# Patient Record
(demographics unavailable — no encounter records)

---

## 2017-10-25 NOTE — CT
CTA CHEST

10/25/17

 

HISTORY: 

18-year-old with history of tingling, pain and chest pressure.

 

Contrast enhanced CTA of the chest is performed. 2D and 3D reconstructed images are performed on an 
independent 3D workstation.

 

CTA chest demonstrates the lung parenchyma to be unremarkable. No evidence of pleural or pericardial
 effusion seen. 

 

No evidence of axillary, hilar or significant mediastinal lymphadenopathy seen. 

 

No evidence of filling defects seen in the pulmonary arteries to suggest pulmonary emboli. 

 

IMPRESSION:  

Normal CTA chest. 

 

POS: [facility]

## 2017-10-25 NOTE — RAD
PORTABLE AP CHEST X-RAY

10/25/2017

 

HISTORY:

Tingling left anterior forearm which started yesterday.  The patient denies tingling sensation in ch
est and abdomen.  The patient reports being 15 weeks pregnant.

 

COMPARISON: 

None available.

 

FINDINGS:

Cardiac silhouette and pulmonary vasculature are within normal limits.  Lungs are clear.  Osseous st
ructures are intact.

 

IMPRESSION:   

No acute cardiopulmonary process.

 

 

POS: [facility]

## 2017-12-03 NOTE — ULT
RIGHT UPPER QUADRANT ULTRASOUND: 

 

Indication: Right upper quadrant pain with a 21-week history of pregnancy. 

 

FINDINGS: 

The liver, visualized pancreas, gallbladder, and common bile duct are within normal limits. There is 
mild right hydronephrosis present which is likely physiologic in nature. There is flow voids seen at 
the level of the bladder. No sonographic york's sign is reported. Common bile duct measures 2.6 mm.
 The right kidney measures 11.8 cm in length. 

 

IMPRESSION: 

Physiologic mild right sided hydronephrosis.  Clinical and ultrasound follow up recommended.

 

POS: [facility]

## 2017-12-03 NOTE — PDOC.LDHP
Labor and Delivery H&P


HPI: 





17 yo  @ 21.2wks by 1TUS with EDC 2017 presents with acute onset 

abdominal pain, nausea, and vomiting after eating shrimp cocktail earlier this 

afternoon. Pt reports that she vomited white yellow color, not bloody, or 

black. Denies contractions, fluid loss, new vaginal discharge. 





GYN hx:


LMP: 2017


Pap Smear: 2016


+chlamydia at that time, treated. 





OBHx:


Pt states that she is seeing a specialist because she had a varicella titer 

come back positive.


Otherwise, no complications with ultrasounds.





Pregnancy Hx:


as stated above. Otherwise unremarkable.





PMHX:denies


PSHX: , EGD with biopsy that was negative; Pt stated this was because she 

used to have severe acid reflux. Denies having symptoms now.





Social Hx:


denies smoking, drinking, drug use





Allergies:


KNDA








Current gestational age (weeks): 21 (21.2)


Due date: 18


Dating criteria: last menstrual period, first trimester ultrasound


Grav: 1


Para: 0


OB History Details: 





see above


Current pregnancy complications: none


Abnormal US findings: No


Past Medical History: 





see above


Current medications: pre-kathryn vitamins


Previous surgical history: other (see above)


Social history: none





- Physical Exam


Vital signs reviewed and normal: yes


General: NAD, resting


Heart: RRR


Lungs: CTAB


Abdomen: gravid


Extremeties: no edema


FHT: category 1


[place] contractions every: none





- Assessment





Viral Gastroenteritis vs Biliary colic


Plan:


Pt is tolerating clear liquids at this time and has not vomited since earlier 

this afternoon ~1600.


Kvng continue to monitor pt on clear liquids and monitor the baby. Currently the 

strip is category 1. There were some initial variable decels however, these 

have resolved with oral fluids.


Pt may have gallstones in pregnancy, and at this point would recommend an 

outpatient work up.


Will plan to discharge patient this evening with close follow-up for workup of 

potential biliary colic.





<Emilie Green - Last Filed: 17 20:03>





<Jhony Malave - Last Filed: 17 01:16>


Allergies/Adverse Reactions: 


 Allergies











Allergy/AdvReac Type Severity Reaction Status Date / Time


 


No Known Allergies Allergy   Verified 17 17:44














Attending Addendum





- Attending Addendum


I personally evaluated the patient and discussed the management with Dr. Peter Lozano.  


I agree with the History, Examination, Assessment and Plan documented above 

with any addition or exceptions noted below.


She has been having acute epigastric abdominal discomfort and in the past day 

or two epigastric pain during or after eating. On my exam she is tender in the 

RUQ and has a positive Alas's sign. Clinically she appears to be having 

gallbladder colic but her GB US is negative for stones or biliary ductal 

abnormality.  Clinical follow up will be needed. In the mean time I did 

recommend her to follow a fat free/low fat diet. [facility]





<Jhony Malave - Last Filed: 17 01:16>

## 2018-01-23 NOTE — PRG
DATE OF SERVICE:  2018.

 

PRIMARY OB:  Prenatal Clinic.

 

CHIEF COMPLAINT:  Abdominal pains.

 

HISTORY OF PRESENT ILLNESS:  The patient is an 18-year-old G1, P0 female with an intrauterine pregnan
cy at 28 weeks and 4 days, who is presenting to Labor and Delivery with a 1-week history of abdominal
 pains which she reports that she has been having some worsening of the pain in the last couple of da
ys and so came in for evaluation.  Patient denies severe pain at the contractions and reports them 4/
10.  She denies any vaginal bleeding, leakage of fluid or discharge.  She denies any urinary urgency 
or frequency.  She denies any recent illness, fever, fall, headache, chest pain, shortness of breath,
 nausea or vomiting.  She denies any new rash.

 

PAST MEDICAL HISTORY:  Negative.

 

PAST SURGICAL HISTORY:  Noncontributory.

 

ALLERGIES:  No known drug allergies.

 

MEDICATIONS:  Prenatal vitamins.

 

OB LABS:  Blood type is A positive.  She is rubella immune, RPR is nonreactive, hepatitis B surface a
ntigen is negative, antibody screen is negative.  She is HIV nonreactive.

 

SOCIAL HISTORY:  Denies drug, alcohol or tobacco use.

 

REVIEW OF SYSTEMS:  Per HPI.

 

PHYSICAL EXAMINATION:

VITAL SIGNS:  Blood pressure 106/59, heart rate of 74, respiratory rate of 18, satting 100% on room a
ir, temperature 98.0.

GENERAL:  She appears to be in no acute distress.  She is alert and oriented, and cooperative and ple
asant to interact with.

HEENT:  Normocephalic, atraumatic.

LUNGS:  Clear to auscultation bilaterally.

HEART:  Regular rate and rhythm.

ABDOMEN:  Soft.  She has a little bit tenderness or deviation of the uterus in the lower pelvis.  She
 has no CVA tenderness.  No SI joint tenderness.

EXTREMITIES:  Nontender, nonedematous.

PELVIC:  Cervix per nursing staff is closed, thick and high.

 

Fetal heart tracing performed for abdominal pain over the course of 2 hours.  Baseline was noted to b
e in the 130s with moderate long-term variability, positive accelerations, no decelerations.  Contrac
tions, she has some irritability with contractions about every 7-10 minutes which she feels as occasi
onal times of tightening.

 

Fetal fibronectin is negative.

 

UA is negative for any signs of infection, negative nitrites, negative leukocyte esterase, negative k
etones, negative bacteria.  Her VP3 is negative for Trichomonas, yeast or Candida and Gardnerella.

 

ASSESSMENT AND PLAN:  The patient is an 18-year-old G1, P0 female with an intrauterine pregnancy at 2
8 weeks' having  contractions, no evidence of labor, has a negative fetal fibronectin suggeste
d that she has a very small chance of going into labor in the next couple of weeks, contractions at p
resent are not causing her discomfort.  Patient was given the option to be discharged home or she can
 rest at home and conceive, rest and eating well help her feel better and return if things get worse 
or to remain here for longer observation.  Patient is comfortable going home and with they understand
, she can return if things worsen.  She has an appointment this Friday to follow up her Prenatal Clin
ic as a routine visit which she has been encouraged to keep this.  Fetus is reassuring by fetal heart
 tracing.

## 2018-02-19 NOTE — PDOC.LDHP
Labor and Delivery H&P


Chief complaint: other (2 days of brownish discharge)


HPI: 


17 yo G1 at 32+3 by 1 T us who presents for vaginal discharge.  Patient has had 

normal fetal movement.  no evangelina BRB. no dysuria.  no gush of fluid.  





Dating criteria: first trimester ultrasound


Current pregnancy complications: other (low lying placenta that resolved with 

MFM ultrasound.  Also they were concerned for size greater than dates and 

scheduled a follow up ultrasound.)





- Physical Exam


Vital signs reviewed and normal: yes


General: NAD, resting


Heart: RRR


Lungs: CTAB


Abdomen: NTTP


Extremeties: no edema


FHT: category 1 (Reactive with accels)





- OB Labs


Blood type: A


RH: positive


Antibody Screen: negative


HIV: negative


RPR: negative


HEPSAg: negative


1 hour GCT: negative


GBS: unknown


Urine drug screen: not done





- Assessment


1. Vaginal discharge-  Will do exam to rule out bleeding and collect VP3.  Will 

continue fetal monitoring to ensure fetal well being.  No dysuria so will not 

do a UA.  So far the NST is reactive and reassuring.  








<Dalton Hinson - Last Filed: 18 10:35>





<Silvana Cat - Last Filed: 18 14:56>


Allergies/Adverse Reactions: 


 Allergies











Allergy/AdvReac Type Severity Reaction Status Date / Time


 


No Known Allergies Allergy   Verified 18 18:42














Attending Addendum





- Attending Addendum


I personally evaluated the patient and discussed the management with Dr. Hinson


I agree with the History, Examination, Assessment and Plan documented above 

with any addition or exceptions noted below- 17 yo  @32.3 weeks presented 

with vaginal discharge- pink then dark brown on Saturday. Had intercourse on 

Friday. No itching. (+) FM. VP3 negative. Category 1 FHTs. D/c home and keep 

follow-up at [facility] as scheduled. 








<Silvana Cat - Last Filed: 18 14:56>

## 2018-02-19 NOTE — PDOC.FPRHP
- Allergies/Adverse Reactions


 Allergies











Allergy/AdvReac Type Severity Reaction Status Date / Time


 


No Known Allergies Allergy   Verified 01/23/18 18:42














- Home Medications


 











 Medication  Instructions  Recorded  Confirmed  Type


 


Prenatal Vit No.130/Iron/Folic 1 capsule PO DAILY 12/03/17 01/23/18 History





[Prenatal Tablet]    














- History


PMHx:


 


PSHx: 





FHx:


 


Social:


 








- Vital signs


BP: []  HR: [] RR: [] Tmax: [] Pox: []% on []  Wt: []   








FMR H&P: Upper Level





- Plan











I, [], have evaluated this patient and agree with findings/plan as outlined by 

intern resident. Pertinent changes/additions are listed here.

## 2018-02-19 NOTE — PDOC.EVN
Event Note





- Event Note


Event Note: 


Sterile speculum examL external anatomy normal, but with thick whitish 

discharge.  cervix is closed and thick visually but again with thick white 

discharge.  no bleeding at all.

## 2018-03-24 NOTE — ULT
OB ULTRASOUND:

3/24/18

 

HISTORY: 

Fever, uterine contractions.

 

FINDINGS:  

There is evidence of a single intrauterine gestation. Cardiac doppler demonstrates fetal heart tones 
with a fetal heart rate of 153 beats per minute. Imaging of the lower uterine segment was not perform
ed to evaluate for presentation of the fetus on this exam. The placenta is located at the fundus. The
re is a normal amount of amniotic fluid with an amniotic fluid index of 12 cm. 

 

FETAL MEASUREMENTS:

Biparietal diameter      9.31 cm      37 weeks, 6 days

Head circumference      33.52 cm      38 weeks, 3 days

Abdominal circumference      32.09 cm      36 weeks

Femur length            7.31 cm      37 weeks, 3 days

 

Estimated gestational age by ultrasound is 37 weeks and 3 days with an JEANE on 4/11/18. Gestational ag
e by last menstrual period is 36 weeks and 3 days.

 

The estimated fetal weight by ultrasound is 3044 grams (6 lb, 11 oz). 

 

This examination was not performed for evaluation of the fetal anatomical structures, but there is ev
idence of a normal appearing four chamber heart, stomach, and bilateral kidneys. Remainder of the fet
al anatomical structures were not imaged on this exam. 

 

Umbilical artery doppler demonstrates a peak systolic velocity and umbilical artery of 36.3 cm/s, end
 diastolic velocity of 17.5 cm/s and systolic to diastolic ratio of 2.07. Fetal score of 2 was obtain
ed for fetal tone, fetal movements, and amniotic fluid volume with a score of 0 obtained for fetal br
eathing. 

 

IMPRESSION:  

1.      Single intrauterine gestation with fetal heart tones documented. Gestational age by ultrasoun
d is 37 weeks and 3 days with an JEANE on 4/11/18.

2.      Estimated fetal weight by ultrasound is 3044 grams (6 lb, 11 oz). This represents 65th percen
tile for fetal weight. 

3.      Imaging of the lower uterine segment was not performed to evaluate for fetal presentation. 

4.      Amniotic fluid index is 12 cm. 

5.      A total fetal biophysical profile score of 6 out of 8 is obtained. 

 

POS: [facility]

## 2018-03-24 NOTE — ULT
FETAL BIOPHYSICAL PROFILE SCORE

3/24/18

 

HISTORY: 

Maternal fever, fetal tachycardia.

 

FINDINGS/IMPRESSION:  

The fetal biophysical profile score was provided on the OB ultrasound also obtained on this date. A t
otal score of 6 out of 8 was obtained with 0 obtained for fetal breathing and a score of 2 obtained e
ach for fetal tone, fetal movements, and amniotic fluid volume. 

 

POS: [facility]

## 2018-03-24 NOTE — PDOC.LDHP
Labor and Delivery H&P


Chief complaint: contractions, other (Low back pain)


HPI: 





18 year old  at 37.1 wks by LMP/10.6 wk U/S. JEANE of 2018. Presents 

with contractions since Thursday, as well as stabbing lower back and right 

flank pain which worsened today. Associated symptoms include N/V. She has been 

unable to keep anything down, including liquids since 15:00 today. Patient's 

current pregnancy has been uncomplicated. PMH is significant only for GERD, not 

treated with any medications. She did have asymptomatic bacteruria with 

negative CARMELINA during current pregnancy. History of candidal vaginitis and BV 

which were treated. 


Due date: 18


Dating criteria: last menstrual period, first trimester ultrasound


Grav: 1


Para: 0


OB History Details: 





1. (+) varicella titers


2. Headaches in pregnancy


3.  contractions


4. Low lying placenta, resolved by MFM sono


5.  contractions


Current pregnancy complications: none


Abnormal US findings: No


Past Medical History: 





1. GERD, not currently being treated


Current medications: pre- vitamins


Previous surgical history: other (Biopsy of stomach in , Cyst removal on 

face)





- Physical Exam


Vital signs reviewed and normal: yes


Abnormal vital signs: , Temp 101.0 F


General: NAD, resting


Heart: other (Tachycardic, no murmur, rubs or gallops)


Lungs: nonlabored breathing


Abdomen: gravid


Extremeties: no edema


FHT: category 2, variability present


[place] contractions every: q3-4 min





- Vaginal Exam


cm dilated: 0


Effacement: 0%


Station: -3





- OB Labs


Blood type: A


RH: positive


Antibody Screen: negative


HIV: negative


RPR: negative


HEPSAg: negative


GBS: negative


Rubella: immune





- Assessment





1. Sepsis, source unknown


2. Maternal tachycardia 2/2 to #1


3. Lactic acidosis


4. Leukocytosis


5. TIUP


6. GERD





- Plan


Plan: admit to L&D


-: 





1. Labs: Blood and urine culture, LA, CBC, CMP, lactic acid


2. BPP


3. U/S for growth


4. Start abx: rocephin


5. LR boluses x3


6. NS IVF at 150 ml/hr


7. Speculum exam to evaluate for discharge


8. VP3 and GC/C swabs


7. Tylenol prn for fever





<Allison Chi - Last Filed: 18 23:36>





<Eloisa Dunn - Last Filed: 18 12:22>


Allergies/Adverse Reactions: 


 Allergies











Allergy/AdvReac Type Severity Reaction Status Date / Time


 


No Known Allergies Allergy   Verified 18 20:05














Attending Addendum





- Attending Addendum


Date/Time: 18 1155





I personally evaluated the patient and discussed the management with Dr. Chi


I agree with the History, Examination, Assessment and Plan documented above 

with any addition or exceptions noted below.








17 yo  female at 37.1 wks by LMP/10.6 wk [place] presents for evaluation of 

contractions, N/V, back pain, and chills. 


Patient reports ctx started on Thursday and have slowly progressed throughout 

the week. Rated 7/10. Occurring every 2 to 3 minutes. +FM. Denies LOF or VB. 

Reports increase white thick discharge per vagina. States back pain started 

Wednesday of this week. Reports N/V and chills today starting at 1500. No sick 

contacts. Denies any other  or GI symptoms. Denies food poisoning. 





Pregnancy history reviewed. Past history of asymptomatic bacteruria with pan 

sensitive E. coli treated with CARMELINA negative. Ct/GC negative. GBS negative. Did 

have history of low lying posterior placenta that resolved in late 2T. Noted to 

have Varicella IgM titer during pregnancy but asymptomatic. [facility] [place] WNL. Has 

followed up routinely with [facility]. Received the Flu vaccine this year. 





Temp 101 mHR = 120.  with minimal varibility, acels present no decels


Appears ill but NAD. Regular but tachycardic rate. Lungs clear. No W/C/R. 

Fundus mildly tender to palpation but inconsistent with examiners. Soft 

abdomen. CVA tenderness noted. 





Blood cultures quickly obtained. IVF boluses started. Labs ordered. Cath urine 

sent. Due to exam and HPI Rocephin given due to suspicion of pyelo. 





On vaginal exam. Mild cervicitis noted. Gc/CT swabs taken. VP3 taken. No pain 

with cervical manipulation. Os closed. No purulent discharge from os. Thick 

white discharge consistent with yeast noted on vaginal sidewalls. 





BPP 6/8 (off for breathing but good fetal movement throughout exam). EFW = 

3044g (65% per Hadlock) BLAIR = 12 cm. 





1. sIUP: IOB labs reviewed. Quad negative. Anatomy WNL. Posterior placenta. 

Cephalic. 3T negative. GBS negative. 


2. Maternal sepsis with unknown source: Continue IVF bolus until maternal and 

fetal vital signs improved. Trend lactic acid. Repeat CBC in AM. Cath UA 

negative. Will start Amp and Gent. Inconsistent fundal tenderness on serial 

exam but unable to perform amniocentesis for cultures. Blood and urine pending. 

Flu swab sent. Abdominal sono ordered to rule out GI pathology. GcCt pending. 

If unable to improve fetal status will discuss need for delivery. However BPP 

still reassuring. 


3. hx of aymptomatic bacteruria


4. Varicella exposure? IgM pos. Asymptomatic. No none exposure. MFM sono WNL. 


5. Posterior low lying placenta: Resolved. 


6. hx of Ct infection 2016: Negative GC/CT this pregnancy. Denies risk. 


7. hx of BV and candida infection: Treated. 





Continue continuous monitoring. Continue IVF bolus. Continue empiric 

antibiotics. Trend labs. Imaging results pending. Continue serial fundal exams. 


Melisa





<Eloisa Dunn - Last Filed: 18 12:22>

## 2018-03-25 NOTE — PDOC.EVN
Event Note





- Event Note


Event Note: 





19 yo  at 37.2 wks by LMP/10.6 wk sono admitted for maternal sepsis (now 

resolved) with unknown source. 


HD#1





Patient placed on intermittent fetal monitoring. 


/mod/pos acels/no decels. 


Ctx: 1 over 1.5 hours








Patient sleeping comfortably. VSS. Afebrile (t max 98.8). No fundal tenderness. 


Blood cx negative to date


Urine cx negative to date.


Latic acidosis - resolved.


WBC 20 --> 13.





Abd sono negative except for mod to severe hydronephrosis. -- Have spoke with 

urology due to severe hydronephrosis and septic picture. Will evaluate patient. 

Believed source could possibly be  related. Suggested if gets worse obtain XR 

of abdomen to see if stone noted. 


UA negative except for ketones. 





Will continue expectant management at this time. 


Have discussed case with laboristKarlos, urology. 


No indication for need for delivery at this time. Continue antibx until 

cultures result.  





Melisa

## 2018-03-25 NOTE — PDOC.LDPN
Labor & Delivery Progress Note





- Subjective


Subjective: comfortable





- Objective


Vital signs reviewed and normal: yes


General: NAD, resting


Uterine fundus: non tender


SVE: 01:30 by Alon


Dilation: 0


Effacement: 0%


Station: -3


FHT: category 1, variability present


[place] contractions every: Irregular





- Assessment


(1) Sepsis


Code(s): A41.9 - SEPSIS, UNSPECIFIED ORGANISM   Current Visit: Yes   Status: 

Acute   


  QualifierTitle:    Sepsis type: sepsis due to unspecified organism   

Qualified Code(s): A41.9 - Sepsis, unspecified organism   


Comment: 18 year old presents with tachycardia, fever to 101.0 F


- Leukocytosis with 91% neutrophils


- Lactic acidosis s/p 3L fluids


- Fever s/p tylenol 1000 mg


- VP3 negative x3, however, speculum exam showed vaginal discharge consistent 

with candidal vaginitis; will treat since sensitivity and specificty of VP3 70-

80%


- Flu swab negative


- Blood and urine cultures pending


- UA negative   





(2) Leukocytosis


Code(s): D72.829 - ELEVATED WHITE BLOOD CELL COUNT, UNSPECIFIED   Current Visit

: Yes   Status: Acute   Comment: - WBC 19 with 90% neutrophils


- Fluid resucitated with 3L fluid and on maintenance IVF at 150 mL/hr


- AM CBC pending   





(3) Lactic acidosis


Code(s): E87.2 - ACIDOSIS   Current Visit: Yes   Status: Acute   Comment: - LA 

3.5


- Repeat LA pending


- s/p 3L fluid and maintenance IVF   





(4) Intrauterine pregnancy in teenager


Code(s): Z34.80 - ENCOUNTER FOR SUPRVSN OF NORMAL PREGNANCY, UNSP TRIMESTER   

Current Visit: Yes   Status: Acute   Comment: - At 37.2 wks with JEANE of 2018


- Closed, thick and high at 01:30   





<Allison Chi - Last Filed: 18 01:51>





Attending Addendum





- Attending Addendum


Date/Time: 18 1223





I personally evaluated the patient and discussed the management with Dr. Chi


I agree with the History, Examination, Assessment and Plan documented above 

with any addition or exceptions noted below.





17 yo  female at 37.1 wks by LMP/10.6 wk sono admitted for maternal sepsis 

with unknown source. 


Patient now with improved HR to 90s. FHT now 160s with mod variability and 

accels. Still with frequent contractions but cervical exam unchanged. No longer 

having fundal tenderness. Will continue fluid bolus x1 then switch to 150 mL/

hr. 





BPP 6/8 (off for breathing but good fetal movement throughout exam). EFW = 

3044g (65% per Hadlock) BLAIR = 12 cm. 





1. sIUP: IOB labs reviewed. Quad negative. Anatomy WNL. Posterior placenta. 

Cephalic. 3T negative. GBS negative. 


2. Maternal sepsis with unknown source: Continue IVF bolus until maternal and 

fetal vital signs improved. Trend lactic acid. Repeat CBC in AM. Cath UA 

negative. Will start Amp and Gent. Inconsistent fundal tenderness on serial 

exam. Blood and urine pending. Flu swab negative. Abdominal sono ordered to 

rule out GI and other  pathology. GcCt pending. If unable to improve fetal 

status will discuss need for delivery. However BPP still reassuring. FHT 

improving with IVF and antibx. Contractions now spaced. Patient no longer 

experiencing much pain with contractions. 


3. hx of aymptomatic bacteruria


4. Varicella exposure? IgM pos. Asymptomatic. No none exposure. MFM sono WNL. 


5. Posterior low lying placenta: Resolved. 


6. hx of Ct infection 2016: Negative GC/CT this pregnancy. Denies risk. 


7. hx of BV and candida infection: Treated. 





Continue continuous monitoring. Continue IVF bolus. Continue empiric 

antibiotics. Trend labs. Imaging results pending. Continue serial fundal exams. 

Laborist on-call present for initial workup and evaluation. No added 

recommendations. 





Melias





<Eloisa Dunn - Last Filed: 18 12:30>

## 2018-03-25 NOTE — PDOC.LDPN
Labor & Delivery Progress Note





- Subjective


Subjective: comfortable





- Objective


Vital signs reviewed and normal: yes


General: NAD


Uterine fundus: non tender


Dilation: 0


Effacement: 0%


Station: -3


FHT: category 1, variability present, absent or minimal variables


[place] contractions every: 8-10





- Assessment


(1) Sepsis


Code(s): A41.9 - SEPSIS, UNSPECIFIED ORGANISM   Current Visit: Yes   Status: 

Acute   


  QualifierTitle:    Sepsis type: sepsis due to unspecified organism   

Qualified Code(s): A41.9 - Sepsis, unspecified organism   


Comment: 18 year old presents with tachycardia, fever to 101.0 F


- Leukocytosis 


- presented with lactic acidosis. No resolved after 3L fluids 


- Fever s/p tylenol 1000 mg


- Flu swab negative


- Blood and urine cultures pending


- UA negative


- Pt is not sick appearing and is stable. Continue to search for source. 

Consider GB and appy imaging today.    





(2) Lactic acidosis


Code(s): E87.2 - ACIDOSIS   Current Visit: Yes   Status: Resolved   Comment: -

Resolved   





(3) Leukocytosis


Code(s): D72.829 - ELEVATED WHITE BLOOD CELL COUNT, UNSPECIFIED   Current Visit

: Yes   Status: Acute   Comment: - Repeat WBC 13.0


- Continue maint IVF dt previous n/v. Consider stopping after pt is 

consistently able to tolerate PO


- Cx pending   





(4) Intrauterine pregnancy in teenager


Code(s): Z34.80 - ENCOUNTER FOR SUPRVSN OF NORMAL PREGNANCY, UNSP TRIMESTER   

Current Visit: Yes   Status: Acute   Comment: - At 37.2 wks with JEANE of 2018


- Closed, thick and high at 01:30. No change on repeat exam at 0800   





(5) Vaginal candidiasis


Code(s): B37.3 - CANDIDIASIS OF VULVA AND VAGINA   Current Visit: Yes   Status: 

Acute   Comment: - VP3 negative x3, however, speculum exam showed vaginal 

discharge consistent with candidal vaginitis; will treat since sensitivity and 

specificty of VP3 70-80%. Tx pending   





<Mathew Cruz - Last Filed: 18 08:15>





Attending Addendum





- Attending Addendum


Date/Time: 18 1231





I personally evaluated the patient and discussed the management with Dr. Cruz


I agree with the History, Examination, Assessment and Plan documented above 

with any addition or exceptions noted below.





17 yo  female at 37.2 wks by LMP/10.6 wk sono admitted for maternal sepsis 

with unknown source. 


Patient now improved. Normal maternal HR. Reports back pain and discomfort has 

improved. +FM. Intermittent contractions throughout the night. Nonpainful. 

Cervical exam this morning unchanged. FHT now 130/mod/pos acels/no decels. 

Recieved 4L of fluid last night. 





Will continue maintenance rate throughout the day. Reassuring fetal heart 

tracing since 0100. Will obtain BPP today. If 8/8 will hold continuous 

monitoring and switch to q shift and prn. Continue antibx. Fundus completely 

nontender on exam this AM. All labs improved. Afebrile overnight. 





BPP 6/8 (off for breathing but good fetal movement throughout exam), BLAIR = 12 

cm. Repeat pending for this AM.  


EFW = 3044g (65% per [facility]) at 37.1 wks. 





1. sIUP: IOB labs reviewed. Quad negative. Anatomy WNL. Posterior placenta. 

Cephalic. 3T negative. GBS negative. 


2. Maternal sepsis with unknown source: Continue IVF at maintenance rate today. 

Lactic acidosis now resolved. WBC improved from 19.6 to 13. Cath UA negative. 

Continue Amp and Gent at least until cultures results. Inconsistent fundal 

tenderness on serial exam, now without any. Blood and urine cultures pending. 

Flu swab negative. Abdominal sono pending this AM. GcCt pending. BPP pending 

for this AM as well. Maternal VS stable. Intermittent nonpainful contractions 

with Cat 1 tracing. 





Due to etiology unknown and inability to perform amniocentesis, discussed case 

at length with Laborist and Karlos on-call in order to best decide active vs 

expectant management. R/B/A discussed. Due to vast improvement and reassuring 

fetus status will continue expectant management and workup. If patient or fetus 

demonstrate any concerns will then proceed to active management. 








3. hx of aymptomatic bacteruria


4. Varicella exposure? IgM pos. Asymptomatic. No none exposure. MFM sono WNL. 


5. Posterior low lying placenta: Resolved. 


6. hx of Ct infection 2016: Negative GC/CT this pregnancy. Denies risk. 


7. hx of BV and candida infection: Treated. 





Continue continuous monitoring. Continue IVFs. Continue empiric antibiotics at 

least until cultures result. Trend labs. Imaging results pending. Continue 

serial fundal exams. 





Melisa





<Eloisa Dunn - Last Filed: 18 13:01>

## 2018-03-25 NOTE — ULT
ABDOMINAL ULTRASOUND:

 

Date:  03/25/18 

 

PROVIDED CLINICAL HISTORY:   

Right-sided abdominal pain. 

 

FINDINGS:

Visualized abdominal aorta, IVC, and pancreas appear normal. The liver demonstrates no mass or intrah
epatic biliary ductal dilatation. The common duct is not dilated. The gallbladder demonstrates no sto
warren, wall thickening, or pericholecystic fluid. There is severe right-sided hydronephrosis. Right kid
krzysztof demonstrates no evidence for mass. The left kidney demonstrates no hydronephrosis or mass. The sp
ophelia is upper limits of normal in size without focal abnormality. Limited sonographic interrogation o
f the right lower quadrant does not demonstrate a discretely identified appendix. 

 

IMPRESSION: 

1.  Moderate-severe right hydronephrosis. 

2.  Nonvisualization of the appendix. 

 

 

POS: [facility]

## 2018-03-25 NOTE — CON
DATE OF CONSULTATION:  2018

 

PRIMARY OB:  Dr. Eloisa Dunn.

 

REFERRING PHYSICIAN:  Dr. Eloisa Dunn.

 

CHIEF COMPLAINT:  Evaluation for delivery.

 

HISTORY OF PRESENT ILLNESS:  The patient is an 18-year-old female who presented to Labor and Delivery
 last night with fever and fetal tachycardia.  A thorough workup yielded no definitive results for so
urce of infection.  With IV hydration, the fetal tachycardia resolved.  Labs were significant and an 
elevated lactate level of 3.5, which returned to 1 after hydration.  Patient has an intrauterine preg
du at 37 weeks and there was some concern a question whether the patient had indication for delive
ry at this time.  Talking with the patient, the patient reports she began feeling nauseous yesterday 
around 2:00 and subsequently had vomited a few times after 4.  She denies feeling that prior to this,
 denies any sick contacts or anything else unusual.  On arrival, the patient was started on Rocephin 
and gentamicin for presumed pyelonephritis and is currently on ampicillin.  Patient today at time of 
evaluation reports she is feeling better.  She is still having some right-sided tenderness in her larry
k and her side, but is improved.

 

PHYSICAL EXAMINATION:

VITAL SIGNS:  This morning, blood pressure 107/59, heart rate of 90, respiratory rate of 18.  Current
 temperature 98.8, T-max of 100.3.

GENERAL:  She appears to be in no acute distress.  She is alert and oriented, cooperative and pleasan
t to interact with.

HEAD:  Normocephalic, atraumatic.

LUNGS:  Clear to auscultation bilaterally.

HEART:  Regular rate and rhythm.

ABDOMEN:  Soft and gravid.  She does have some mild tenderness in her right side to upper quadrant po
sterior to the gravid uterus.  She has some flank tenderness deep to the musculoskeletal region of he
r back, all mild.

EXTREMITIES:  No extremity tenderness or edema to palpation.

 

LABORATORY DATA:  A urinalysis yesterday was completely void of any signs of infection.  White count 
is 13, down from 19.6, hemoglobin 11.8, hematocrit 35.1, neutrophil percentage 85%.  BMP is unremarka
ble except for the lactic acid which dropped from 3.5-1.3 after hydration.  Hepatitis B and syphilis 
are both negative.  Biophysical profile today is 8/8.  Abdominal ultrasound shows moderate to severe 
right hydronephrosis and nonvisualized appendix, gallbladder appeared to be normal without stones or 
wall thickening.  Again, BPP is 8/8.  Tracings continued to be reactive through the day.

 

ASSESSMENT AND PLAN:  The patient is an 18-year-old female with an intrauterine pregnancy at 37 weeks
 and 2 days who appears to have some sort of infectious process going on that as evidenced by a leuko
cytosis and an elevated temperature.  Workup is possibly supported by elevated lactic acid which has 
since resolved.  The patient has remained afebrile since the initial 100.3 and white count has droppe
d to 13.  The patient's symptoms on exam are all mild and could be supported by this hydronephrosis t
hat is seen on ultrasound that may be due to the size and position of the uterus as the patient is sm
all in stature.  There are no signs of infection to support infection source at this time.  I was giv
en the entire picture.  Recommendations would be expected management as there is no clear indication 
for delivery at this time and the risk for induction include  and complications associated w
ith  in future pregnancies.

 

Thank you for the opportunity to participate in Ms. Patterson's care should her status change with more
 clear signs of infection.  She may warrant a medically indicated induction with the understood risks
 of that direction.

## 2018-03-25 NOTE — ULT
FETAL BIOPHYSICAL PROFILE ULTRASOUND EXAMINATION:

 

Date:  03/25/18 

 

HISTORY:  

Maternal infection. 

 

FINDINGS:

There is a single intrauterine gestation in cephalic presentation. Cardiac Doppler demonstrates fetal
 heart tones, with a fetal heart rate of 143 beats/minute. The placenta is located posteriorly and fu
ndally without evidence of placenta previa. There is a normal amount of amniotic fluid with an amniot
ic fluid index of 11.4 cm. 

 

A score of 2 was obtained each for fetal tone, fetal breathing, fetal movements, and amniotic fluid v
olume. 

 

Fetal anatomical structures were not evaluated with this examination and fetal measurements were also
 noted obtained on this exam. 

 

IMPRESSION: 

1.  Single intrauterine gestation in cephalic presentation with fetal heart tones documented. 

 

2.  Amniotic fluid index is 11.4 cm. 

 

3.  A total fetal biophysical profile score of 8 out of 8 is obtained. 

 

 

POS: [facility]

## 2018-03-26 NOTE — PDOC.LDPN
Labor & Delivery Progress Note





- Subjective


Subjective: comfortable, other (19 yo  who initially presented with right 

flank pain and n/v. She endorses mild right flank pain this am. But she has 

been tolerating a normal diet.)





- Objective


Vital signs reviewed and normal: yes


General: NAD, resting


Other exam findings: Cardiac: RRR, no m/r/g, normal s1 and s2; Resp: CTAB, no w/

r/r


Procedures: Abd: gravid; mildly right CVA tenderness





- Assessment


(1) Sepsis


Code(s): A41.9 - SEPSIS, UNSPECIFIED ORGANISM   Current Visit: Yes   Status: 

Acute   


  QualifierTitle:    Sepsis type: sepsis due to unspecified organism   

Qualified Code(s): A41.9 - Sepsis, unspecified organism   


Comment: 18 year old who initially presented with tachycardia, fever to 101.0 F

, and leukocytosis


- presented with lactic acidosis. No resolved after 3L fluids 


- Flu swab negative


- Blood and urine cultures NGTD


- UA negative


- Abdominal ultrasound showed right moderate to severe hyodronephrosis


- Urology consulted, pending recommendations


- Pt is not sick appearing and is stable. She is tolerating a normal diet.   





(2) Leukocytosis


Code(s): D72.829 - ELEVATED WHITE BLOOD CELL COUNT, UNSPECIFIED   Current Visit

: Yes   Status: Acute   Comment: - Repeat WBC 7.3


- Fluids dc'd as she is tolerating a normal diet.


   





(3) Lactic acidosis


Code(s): E87.2 - ACIDOSIS   Current Visit: Yes   Status: Resolved   Comment: -

Resolved   





<Emilie Green - Last Filed: 18 10:19>





Attending Addendum





- Attending Addendum


Date/Time: 18 4366





I personally evaluated the patient and discussed the management with Dr. Lozano and team.


I agree with and repeated the History, Examination, Assessment and Plan 

documented above with any addition or exceptions noted below.





Improved from admission.  Await urologic input concerning R hydronephrosis.  

Continue antibiotics.








<Yordy Calderón - Last Filed: 18 11:37>

## 2018-03-26 NOTE — CON
DATE OF CONSULTATION:  03/26/2018 by Dr. Askew.

 

CONSULTING PHYSICIAN:  Family Medicine.

 

REASON FOR CONSULTATION:  Severe right hydronephrosis and flank pain.

 

HISTORY OF PRESENT ILLNESS:  Binta is an 18-year-old white female G1, P0, currently 37 weeks' preg
nant, who presented with a fever of 101 and a fairly severe right flank pain.  She underwent a renal 
ultrasound, which demonstrated severe right-sided hydronephrosis, which was worse than what would be 
expected during a routine pregnancy.  She was started on Rocephin originally with significant resolut
ion of her fever and white count, which was initially 19.6, which has subsequently decreased down to 
7.3.  Out of some potential concern for chorioamnionitis, she was switched to ampicillin and gentamic
in.  She has not had any further fevers or elevated white count while in the hospital, but she contin
ues to have fairly significant right flank pain, which is poorly controlled.  She is currently being 
treated with low-dose fentanyl and intermittent Tylenol with inadequate control of her pain.  On my d
iscussion with the patient, she has no prior history of hematuria, kidney stones, urinary tract infec
tions, or voiding difficulties.  Interestingly, she had a urinalysis done at the time of arrival, whi
ch did not demonstrate any blood in the urine, white cells, leukocyte esterase, or any other findings
 other than 40 ketones.  Cultures were taken, both from the urine and blood, which demonstrated no gr
owth in the urine and no growth in both blood cultures.

 

ALLERGIES:  None.

 

CURRENT MEDICATIONS:  Prenatal vitamins.

 

PAST MEDICAL HISTORY:  Gastroesophageal reflux disease.

 

PAST SURGICAL HISTORY:  None.

 

SOCIAL HISTORY:  Patient denies illicit drug, smoking, or alcohol abuse.

 

FAMILY HISTORY:  Noncontributory.

 

REVIEW OF SYSTEMS:  A 12-point review of systems was reviewed and otherwise unremarkable other than w
hat was commented on the HPI.  Specifically, she is no longer having any fevers or chills, nausea, or
 vomiting.  She still has right-sided flank pain, denies any urinary symptoms as noted above.  Remain
sheri of review of systems was reviewed and negative.

 

PHYSICAL EXAMINATION:

VITAL SIGNS:  Temperature 98.2, pulse 78, respirations 16, blood pressure 118/72, saturations 99% on 
room air.

GENERAL:  No apparent distress, communicative, alert, well-nourished, well-developed, appears stated 
age.

HEENT:  Normocephalic, atraumatic.  Sclerae nonicteric.  Pupils are symmetric and round.  Moist mucou
s membranes.  Adequate dentition.  Trachea midline.

CARDIOVASCULAR:  Regular rate and rhythm.  Normal S1 and S2.  Physiologic murmur.  Symmetric pulses.

CHEST:  Clear to auscultation bilaterally.  No increased work of breathing.  Symmetric expansion of l
ungs.

ABDOMEN:  Gravid uterus with mild tenderness to palpation in the right lower quadrant.  Mild right CV
A tenderness.  No tenderness on the left.  No significant suprapubic tenderness.

GENITOURINARY:  Deferred at this time.

EXTREMITIES:  1+ edema bilaterally.  No clubbing or cyanosis.

MUSCULOSKELETAL:  No joint deformities or joint erythema noted.  Full range of motion in all extremit
ies.

NEUROLOGIC:  Cranial nerves II-XII grossly intact.  No focal motor or sensory deficits identified.

SKIN:  Warm, dry, good turgor without lesions or rashes.

PSYCHIATRIC:  Alert and oriented x3, appropriate mood and affect for situation.

 

LABORATORY EVALUATION:  A full set of labs are in the [facility] system, which I have reviewed.  Of not
e, the patient's white count is currently 7.3, down from 19.6, creatinine is currently 0.55.  Sodium 
is slightly low at 134.  Urinalysis is unremarkable other than 40 ketones.  Cultures are negative x3.
  On imaging, abdominal ultrasound demonstrates severe right-sided hydronephrosis with no hydronephro
sis on the left.  Bladder was not evaluated.

 

ASSESSMENT AND PLAN:  An 18-year-old  female with very likely ureteral stone, although the ur
inalysis is negative.  There is approximately 3%-4% incidence of stones passing without apparent hema
turia if the stone is completely obstructive.  Given the severity of her pain and her current gestati
onal timeframe, she is at term and I would recommend induction of labor at this point, although we ca
n obtain a CT scan relatively safely at her current time and pregnancy.  I would not necessarily aly
mmend any surgical procedures unless she delivers.  She really does not wish to perform a CT scan whi
le she is pregnant despite the low risk, but again given that she is already 37 weeks, I think would 
be reasonable for her to go ahead and be induced.  Once she has delivered her baby, we can obtain a C
T scan to evaluate for stone or other potential causes of her hydronephrosis.  If the hydronephrosis 
is entirely caused by the pregnancy then her symptoms would resolve.  After she delivered, if there i
s a stone present, then we will discover on the CT scan, at which time we can treat her on this hospi
evert stay with ureteroscopy or with shockwave lithotripsy to break up the stone.  If there is another 
etiology, we can do further workup without concern for potential fetal harm given if that she is alre
kev delivered.  The patient already states that she would like to deliver to avoid having to take mor
e pain medications or delay out her treatment for her flank pain, I think this is very reasonable fro
m my standpoint.  I do think that her pain is being inadequately treated and I think that she can hayden
e higher doses of pain medication.  Given that she is already in her third trimester without any sign
ificant danger or compromise to the fetus.  I will increase her pain medication to Norco 5/325 mg 1-2
 tablets q.4 hours p.r.n. pain and a recommend induction of labor, which can be carried out by the OB
/GYN team or the Family Medicine team.  Once she has delivered her baby, then we will plan for a CT s
can, if her pain has not resolved, I will continue to follow along and make recommendations.  For now
, I will obtain a renal bladder ultrasound to look for ureteral jets on the right.  If there is an ab
sence of a ureteral jet on the right, this will make a stronger recommendation for her to go ahead an
d deliver the baby first.

## 2018-03-26 NOTE — ULT
RENAL ULTRASOUND:

 

Comparison: None. 

 

History: Pregnant female with hydronephrosis. Evaluate for ureteral jets. 

 

Technique: Multiplanar grayscale and color doppler images were obtained in a renal ultrasound. 

 

FINDINGS: 

The left kidney is normal in echogenicity without hydronephrosis or calculus and measures 10.7 cm in 
length. There is moderate right sided hydronephrosis. The right kidney measures 9.8 cm in length. 

 

The patient voided just before the exam was performed. The left ureteral jet could be visualized easi
ly. The right ureteral jet was difficult to visualize but was eventually seen. 

 

IMPRESSION: 

Moderate right hydronephrosis. 

 

POS: [facility]

## 2018-03-27 NOTE — PDOC.EVN
Event Note





- Event Note


Event Note: 


OB on Call:


I was called early this AM informally by Dr nguyen (not official consult) to 

"clear" the patient for induction of labor. EGA 37 weeks 4 days, possible 

obstructed uropathy NOS. Urology has evaluated. Also with possible Fever 

unknown origin. As we reviewed her case on the phone, and as urology has now 

requested delivery due to possible pressure on ureter from gravid uterus, I do 

beliebe trial of induction is medically necessary.

## 2018-03-27 NOTE — PDOC.LDPN
Labor & Delivery Progress Note





- Subjective


Subjective: comfortable, other (18 year old G1 who initially presented with 

tachycardia, fever to 101.0 F, and leukocytosis)





- Objective


Vital signs reviewed and normal: yes


General: NAD, resting


Dilation: closed


Effacement: 0%


Station: -3


FHT: category 1


[place] contractions every: not cesar





- Assessment


(1) Term pregnancy


Code(s): Z34.80 - ENCOUNTER FOR SUPRVSN OF NORMAL PREGNANCY, UNSP TRIMESTER   

Current Visit: Yes   Status: Acute   Comment: Early term pregnancy, medically 

induced 2/2 sepsis d/t suspected right ureteral stone.


-Vital wnl, leukocytosis resolved, pain inadequately controlled.


-Cytotec for induction; last check 0730 closed/0/high


-Labor checks q4h


-okay to eat clear liquid diet   





(2) Sepsis


Code(s): A41.9 - SEPSIS, UNSPECIFIED ORGANISM   Current Visit: Yes   Status: 

Acute   


  QualifierTitle:    Sepsis type: sepsis due to unspecified organism   

Qualified Code(s): A41.9 - Sepsis, unspecified organism   


Comment: 18 year old G1 who initially presented with tachycardia, fever to 

101.0 F, and leukocytosis


- presented with lactic acidosis. No resolved after 3L fluids 


- Flu swab negative


- Blood and urine cultures NGTD


- UA negative


- Abdominal ultrasound showed right moderate to severe hyodronephrosis


- Urology consulted, recommended IOL, medically indicated


- Dr. Fuentes consulted, recommended IOL, medically indicated


- Pt is not sick appearing and is stable.   





(3) Leukocytosis


Code(s): D72.829 - ELEVATED WHITE BLOOD CELL COUNT, UNSPECIFIED   Current Visit

: Yes   Status: Acute   Comment: - Repeat WBC 7.3


- Fluids dc'd as she is tolerating a normal diet.


-Will switch to clears for IOL.


   





(4) Lactic acidosis


Code(s): E87.2 - ACIDOSIS   Current Visit: Yes   Status: Resolved   Comment: -

Resolved   





<Emilie Geren - Last Filed: 03/27/18 08:45>





Attending Addendum





- Attending Addendum


Date/Time: 03/27/18 1158





I personally evaluated the patient and discussed the management with Dr. Lozano and team.


I agree with and repeated the History, Examination, Assessment and Plan 

documented above with any addition or exceptions noted below.





Cat 1, ctx q2-3m, continue induction as recommended by urology and ob/gyn.  

Appreciate their assistance.











<Yordy Calderón - Last Filed: 03/27/18 11:59>

## 2018-03-27 NOTE — PDOC.LDPN
Labor & Delivery Progress Note





- Subjective


Subjective: comfortable





- Objective


Vital signs reviewed and normal: yes


General: NAD, resting


Dilation: 1.5


Effacement: 50%


Station: -3


FHT: category 1


[place] contractions every: 1-2 minutes





- Assessment


(1) Term pregnancy


Code(s): Z34.80 - ENCOUNTER FOR SUPRVSN OF NORMAL PREGNANCY, UNSP TRIMESTER   

Current Visit: Yes   Status: Acute   Comment: Early term pregnancy, medically 

induced 2/2 sepsis d/t suspected obtructive uropathy.


-Cytotec for induction; last check 1630 1.5/50/high


-cytotec placed shortly after lunch; currently contractions too close together 

to place another at this time.


-Labor checks q4h


-okay to eat clear liquid diet   








(3) Sepsis


Code(s): A41.9 - SEPSIS, UNSPECIFIED ORGANISM   Current Visit: Yes   Status: 

Acute   


  QualifierTitle:    Sepsis type: sepsis due to unspecified organism   

Qualified Code(s): A41.9 - Sepsis, unspecified organism   


Comment: 18 year old G1 who initially presented with tachycardia, fever to 

101.0 F, and leukocytosis


- presented with lactic acidosis. No resolved after 3L fluids 


- Flu swab negative


- Blood and urine cultures NGTD


- UA negative


- Abdominal ultrasound showed right moderate to severe hyodronephrosis


- Urology consulted, recommended IOL, medically indicated


- Dr. Fuentes consulted, recommended IOL, medically indicated


- Pt is not sick appearing and is stable.   





(4) Leukocytosis


Code(s): D72.829 - ELEVATED WHITE BLOOD CELL COUNT, UNSPECIFIED   Current Visit

: Yes   Status: Acute   Comment: - Repeat WBC 7.3


- Fluids dc'd as she is tolerating a normal diet.


-Will switch to clears for IOL.


   





(5) Lactic acidosis


Code(s): E87.2 - ACIDOSIS   Current Visit: Yes   Status: Resolved   Comment: -

Resolved   





<Emilie Green - Last Filed: 03/27/18 20:41>





Attending Addendum





- Attending Addendum


Date/Time: 03/28/18 5668





I personally evaluated the patient and discussed the management with team


I agree with the History, Examination, Assessment and Plan documented above 

with any addition or exceptions noted below.








<Yordy Calderón - Last Filed: 03/28/18 17:39>

## 2018-03-27 NOTE — PDOC.LDPN
Labor & Delivery Progress Note





- Subjective


Subjective: comfortable





- Objective


General: NAD


Dilation: fingertip 


Effacement: 50%


Station: -3


FHT: category 1


[place] contractions every: 1-2 minutes





- Assessment


(1) Term pregnancy


Code(s): Z34.80 - ENCOUNTER FOR SUPRVSN OF NORMAL PREGNANCY, UNSP TRIMESTER   

Current Visit: Yes   Status: Acute   Comment: Early term pregnancy, medically 

induced 2/2 sepsis d/t suspected obtructive uropathy.


-Cytotec for induction; last check 1230 fingertip/50/high


-Labor checks q4h


-okay to eat clear liquid diet   








(3) Sepsis


Code(s): A41.9 - SEPSIS, UNSPECIFIED ORGANISM   Current Visit: Yes   Status: 

Acute   


  QualifierTitle:    Sepsis type: sepsis due to unspecified organism   

Qualified Code(s): A41.9 - Sepsis, unspecified organism   


Comment: 18 year old G1 who initially presented with tachycardia, fever to 

101.0 F, and leukocytosis


- presented with lactic acidosis. No resolved after 3L fluids 


- Flu swab negative


- Blood and urine cultures NGTD


- UA negative


- Abdominal ultrasound showed right moderate to severe hyodronephrosis


- Urology consulted, recommended IOL, medically indicated


- Dr. Fuentes consulted, recommended IOL, medically indicated


- Pt is not sick appearing and is stable.   





(4) Leukocytosis


Code(s): D72.829 - ELEVATED WHITE BLOOD CELL COUNT, UNSPECIFIED   Current Visit

: Yes   Status: Acute   Comment: - Repeat WBC 7.3


- Fluids dc'd as she is tolerating a normal diet.


-Will switch to clears for IOL.


   





(5) Lactic acidosis


Code(s): E87.2 - ACIDOSIS   Current Visit: Yes   Status: Resolved   Comment: -

Resolved   





<Emilie Green - Last Filed: 03/27/18 20:38>





Attending Addendum





- Attending Addendum


Date/Time: 03/28/18 1975





I personally evaluated the patient and discussed the management with team.


I agree with the History, Examination, Assessment and Plan documented above 

with any addition or exceptions noted below.





Will need to check gent levels if not ordered








<Yordy Calderón - Last Filed: 03/28/18 15:49>

## 2018-03-27 NOTE — PDOC.LDPN
Labor & Delivery Progress Note





- Subjective


Subjective: other (beginning to have painful ctx (7/10))





- Objective


Vital signs reviewed and normal: yes


General: NAD, breathing through contractions


Uterine fundus: non tender


SVE: 1/50/-2, anterior, soft


FHT: category 1


[place] contractions every: 2 minutes


Procedures: cook balloon placement





- Assessment


(1) Prenatal hydronephrosis in pregnancy in third trimester, antepartum 

condition


Code(s): O35.8XX0 - MATERNAL CARE FOR OTH FETAL ABNORMALITY AND DAMAGE, UNSP   

Current Visit: Yes   Status: Acute   





(2) Intrauterine pregnancy in teenager


Code(s): Z34.80 - ENCOUNTER FOR SUPRVSN OF NORMAL PREGNANCY, UNSP TRIMESTER   

Current Visit: Yes   Status: Acute   Comment: 19yo G1 @ 37.2 wks with JEANE of 04/ 13/2018   





(3) Term pregnancy


Code(s): Z34.80 - ENCOUNTER FOR SUPRVSN OF NORMAL PREGNANCY, UNSP TRIMESTER   

Current Visit: Yes   Status: Acute   Comment: Early term pregnancy with 

medically induced 2/2 severe hydronephrosis in rt kidney due to suspected 

obtructive uropathy s/p cytotec x 2 placement with tachysystole. cervix now soft

, anterior and 1.5/50/-2 with cook balloon placement. leave in place x 12hr and 

cont fetal monitoring, titrate balloon up to 80ml on each and epidural/

anesthesia consult prn   


Plan: continue plan of care, other (cook balloon placed successfully)

## 2018-03-27 NOTE — PDOC.EVN
Event Note





- Event Note


Event Note: 


After urology recommendations placed by Dr. Askew, and concern for patient's 

continued hydronephrosis and pain, as well at the patient's desires, we will 

proceed with medically-indicated IOL for concern for obstructive uropathy.


Additionally, discussed case with consulted OB hospitalist. The physician on 

call this evening, Dr. Fuentes, agreed with urology recommendations. Pt is term 

gestational age and has continued rt flank pain, and persistent moderate to 

severe hydronephrosis. 


Delivery may resolve/ improve symptoms, but will ultimately allow further 

urologic work-up and treatment. 


Pt meets criteria as medically-indicated induction of labor for concern for 

obstructive uropathy in a term pregnancy. 





Discussed POC with attending, Dr. Dung Devries, who agrees with the above plan.


Will proceed with cytotec IOL due to unfavorable cervix at this time. 


Discussed risks, benefits and alternatives at length with pt who supports 

proceeding with medically-indicated induction.

## 2018-03-28 NOTE — PDOC.LDPN
Labor & Delivery Progress Note





- Subjective


Subjective: comfortable, other (19 yo  who initially presented with right 

flank pain and n/v, induced for supspected right obstructive uropathy. Doing 

well this am. Pain controlled with epidural. Pt had cooks balloon placed last 

night.)





- Objective


Vital signs reviewed and normal: yes


General: NAD, resting


Uterine fundus: non tender


Dilation: 3


Effacement: 50%


Station: -2


FHT: category 1


[place] contractions every: 3-4 minutes


Other exam findings: pt has epidural





- Assessment


(1) Term pregnancy


Code(s): Z34.80 - ENCOUNTER FOR SUPRVSN OF NORMAL PREGNANCY, UNSP TRIMESTER   

Current Visit: Yes   Status: Acute   Comment: Early term IUP here for medically-

indicated IOL for severe hydronephrosis in rt kidney due to suspected 

obtructive uropathy s/p cytotec x 2 placement with tachysystole.


Pt had balloon placed overnight. Labor check this am was 3/50/-2 with the 

cervix being soft and anterior. Gallo score of a 7. We will start pitocin 

today for IOL and continue labor checks q2h.


   





(2) Prenatal hydronephrosis in pregnancy in third trimester, antepartum 

condition


Code(s): O35.8XX0 - MATERNAL CARE FOR OTH FETAL ABNORMALITY AND DAMAGE, UNSP   

Current Visit: Yes   Status: Acute   Comment: see above.   





(3) Sepsis


Code(s): A41.9 - SEPSIS, UNSPECIFIED ORGANISM   Current Visit: Yes   Status: 

Resolved   


  QualifierTitle:    Sepsis type: sepsis due to unspecified organism   

Qualified Code(s): A41.9 - Sepsis, unspecified organism   


Comment: 18 year old G1 who initially presented with tachycardia, fever to 

101.0 F, and leukocytosis


- presented with lactic acidosis. No resolved after 3L fluids 


- Flu swab negative


- Blood and urine cultures NGTD


- UA negative


- Abdominal ultrasound showed right moderate to severe hyodronephrosis


- Urology consulted, recommended IOL, medically indicated


- Dr. Fuentes consulted, recommended IOL, medically indicated


- Pt is not sick appearing and is stable.   





(4) Leukocytosis


Code(s): D72.829 - ELEVATED WHITE BLOOD CELL COUNT, UNSPECIFIED   Current Visit

: Yes   Status: Resolved   Comment: - Repeat WBC 7.3


- Fluids dc'd as she is tolerating a normal diet.


-Will switch to clears for IOL.


   





(5) Lactic acidosis


Code(s): E87.2 - ACIDOSIS   Current Visit: Yes   Status: Resolved   Comment: -

Resolved   





<Emilie Green - Last Filed: 18 10:54>





Attending Addendum





- Attending Addendum


Date/Time: 18 3369





I personally evaluated the patient and discussed the management with Dr. Green.


I agree with the History, Examination, Assessment and Plan documented above 

with any addition or exceptions noted below.


we removed the cooks balloon this mronig and exam revealed favorable cervix.  

pitocin to be initiated.





<Fran Bryant - Last Filed: 18 17:38>

## 2018-03-28 NOTE — PDOC.LDPN
Labor & Delivery Progress Note





- Subjective


Subjective: comfortable, no concerns





- Objective


Vital signs reviewed and normal: yes


General: NAD, resting


Uterine fundus: non tender


Dilation: 5


Effacement: 75%


Station: -1


FHT: category 1, variability present


[place] contractions every: q2-3min


Other exam findings: bulging bag





- Assessment


(1) Term pregnancy


Code(s): Z34.80 - ENCOUNTER FOR SUPRVSN OF NORMAL PREGNANCY, UNSP TRIMESTER   

Current Visit: Yes   Status: Acute   Comment: 5/70/-1. Pit at 10 now.  Ctx q2-3 

min, Ed[place] strip.  Making cervical change, continue expectant management.  

Consider AROM at next check if not continuing to change.  


   





(2) Prenatal hydronephrosis in pregnancy in third trimester, antepartum 

condition


Code(s): O35.8XX0 - MATERNAL CARE FOR OTH FETAL ABNORMALITY AND DAMAGE, UNSP   

Current Visit: Yes   Status: Acute   Comment: Continuing amp/gent per urology's 

recs, treating empirically for sepsis 2/2 obstructive uropathy.  Repeat UA 

negative, afebrile >48hrs. Pain well controlled at this time.   


Plan: continue plan of care





<Serafin Baumann - Last Filed: 03/28/18 15:33>





Attending Addendum





- Attending Addendum


Date/Time: 03/28/18 2110





I personally evaluated the patient and discussed the management with Dr. Baumann.


I agree with the History, Examination, Assessment and Plan documented above 

with any addition or exceptions noted below.








<Yordy Calderón - Last Filed: 03/28/18 21:10>

## 2018-03-28 NOTE — PDOC.LDPN
Labor & Delivery Progress Note





- Subjective


Subjective: comfortable, no concerns





- Objective


Vital signs reviewed and normal: yes


General: NAD


Uterine fundus: non tender


SVE: 6/75/-1, feels not well engaged and ROP


FHT: category 1, variability present


[place] contractions every: 2-3 min





- Assessment


(1) Term pregnancy


Code(s): Z34.80 - ENCOUNTER FOR SUPRVSN OF NORMAL PREGNANCY, UNSP TRIMESTER   

Current Visit: Yes   Status: Acute   Comment: Pit now at 12- will titrate up as 

tolerated. adequate MVUs per [facility] (placed approx 1730 by Dr. Baumann after AROM).

    





(2) Intrauterine pregnancy in teenager


Code(s): Z34.80 - ENCOUNTER FOR SUPRVSN OF NORMAL PREGNANCY, UNSP TRIMESTER   

Current Visit: Yes   Status: Acute   





(3) Prenatal hydronephrosis in pregnancy in third trimester, antepartum 

condition


Code(s): O35.8XX0 - MATERNAL CARE FOR OTH FETAL ABNORMALITY AND DAMAGE, UNS   

Current Visit: Yes   Status: Acute   Comment: Continuing amp/gent per urology's 

recs, treating empirically for sepsis 2/2 obstructive uropathy.  Repeat UA 

negative, afebrile >48hrs. Pain well controlled at this time.   


Plan: continue plan of care, pitocin for augmentation

## 2018-03-28 NOTE — PDOC.LDPN
Labor & Delivery Progress Note





- Subjective


Subjective: painful contractions





- Objective


Vital signs reviewed and normal: yes


General: NAD, breathing through contractions


Uterine fundus: non tender


SVE: 7/85/0, more caput and improved head engagement


FHT: category 2 (minimal variability, otherwise + spontaneous accels, no decels)


[place] contractions every: 4-5 min, pit at 6 now after turned off for 

hyperstimulation


Other exam findings: head much more engaged and feels like it is rotating more


Resuscitative measures: maternal position change





- Assessment


(1) Term pregnancy


Code(s): Z34.80 - ENCOUNTER FOR SUPRVSN OF NORMAL PREGNANCY, UNSP TRIMESTER   

Current Visit: Yes   Status: Acute   Comment: Pit now at 6. Stopped after an 

episode of hyperstimulation with minimal variability. Allowed to rest off pit 

and FHTs to return to Cat I strip. will titrate up pit as tolerated and monitor 

MVUs. recheck in 2 hr   





(2) Intrauterine pregnancy in teenager


Code(s): Z34.80 - ENCOUNTER FOR SUPRVSN OF NORMAL PREGNANCY, UNSP TRIMESTER   

Current Visit: Yes   Status: Acute   





(3) Prenatal hydronephrosis in pregnancy in third trimester, antepartum 

condition


Code(s): O35.8XX0 - MATERNAL CARE FOR OTH FETAL ABNORMALITY AND DAMAGE, [facility]   

Current Visit: Yes   Status: Acute   Comment: Continuing amp/gent per urology's 

recs, treating empirically for sepsis 2/2 obstructive uropathy.  Repeat UA 

negative, afebrile >48hrs. Pain well controlled at this time.   


Plan: continue plan of care, pitocin for augmentation

## 2018-03-28 NOTE — PDOC.LDPN
Labor & Delivery Progress Note





- Subjective


Subjective: comfortable, painful contractions (mildly painful ctx)





- Objective


Vital signs reviewed and normal: yes


General: NAD


Uterine fundus: non tender


SVE: 1/50/-2, posterior cervix


FHT: category 1


[place] contractions every: 2-3 min





- Assessment


(1) Term pregnancy


Code(s): Z34.80 - ENCOUNTER FOR SUPRVSN OF NORMAL PREGNANCY, UNSP TRIMESTER   

Current Visit: Yes   Status: Acute   Comment: Early term IUP here for medically-

indicated IOL for severe hydronephrosis in rt kidney due to suspected 

obtructive uropathy s/p cytotec x 2 placement with tachysystole.


cervical exam unchanged 1/50/-2, posterior and medium consistency.


given relatively unchanged cerical exam, will place cook balloon with Dr. Devries 

shortly.   





(2) Intrauterine pregnancy in teenager


Code(s): Z34.80 - ENCOUNTER FOR SUPRVSN OF NORMAL PREGNANCY, UNSP TRIMESTER   

Current Visit: Yes   Status: Acute   





(3) Prenatal hydronephrosis in pregnancy in third trimester, antepartum 

condition


Code(s): O35.8XX0 - MATERNAL CARE FOR OTH FETAL ABNORMALITY AND DAMAGE, UNSP   

Current Visit: Yes   Status: Acute   


Plan: continue plan of care

## 2018-03-29 NOTE — PDOC.LDPN
Labor & Delivery Progress Note





- Subjective


Subjective: comfortable, no concerns





- Objective


Vital signs reviewed and normal: yes


General: NAD, resting


Uterine fundus: non tender


SVE: 7.5/90/+1


FHT: category 1 (alternates btw cat I and II depending on variability), 

category 2, variability present


[place] contractions every: 2-4min


Other exam findings: fetal head asynclitic with LOP to LOT


Resuscitative measures: maternal position change





- Assessment


(1) Term pregnancy


Code(s): Z34.80 - ENCOUNTER FOR SUPRVSN OF NORMAL PREGNANCY, UNSP TRIMESTER   

Current Visit: Yes   Status: Acute   Comment: Continue maternal position changes

, and pit titration as tolerated.    





(2) Intrauterine pregnancy in teenager


Code(s): Z34.80 - ENCOUNTER FOR SUPRVSN OF NORMAL PREGNANCY, UNSP TRIMESTER   

Current Visit: Yes   Status: Acute   





(3) Prenatal hydronephrosis in pregnancy in third trimester, antepartum 

condition


Code(s): O35.8XX0 - MATERNAL CARE FOR OTH FETAL ABNORMALITY AND DAMAGE, UNSP   

Current Visit: Yes   Status: Acute   Comment: Continuing amp/gent per urology's 

recs, treating empirically for sepsis 2/2 obstructive uropathy.  Repeat UA 

negative, afebrile >48hrs. Pain well controlled at this time.

## 2018-03-29 NOTE — PDOC.PP
Post Partum Progress Note


Post Partum Day #: 0


PO intake tolerated: no


Flatus: no


Ambulation: no


 Vital Signs (12 hours)











  Temp Pulse Resp BP BP


 


 18 03:00  99.1 F  71  18   110/61


 


 18 00:00  98.8 F  75  18  109/70 








 Weight











Weight                         69.4 kg

















- Physical Examination


General: NAD


Cardiovascular: no m/r/g, RRR


Respiratory: clear to auscultation bilaterally, non-labored breathing


Abdominal: lochia, appropriately TTP


Neurological: no gross focal deficits


Psychiatric: A&Ox3, normal affect


Result Diagrams: 


 18 08:58





 18 05:55


Additional Labs: 


 Post Partum Labs











Blood Type  A POSITIVE   18  20:33    


 


Hep Bs Antigen  Non-Reactive S/CO (NonReactive)   18  20:33    











(1) Term pregnancy


Code(s): Z34.80 - ENCOUNTER FOR SUPRVSN OF NORMAL PREGNANCY, UNSP TRIMESTER   

Status: Acute   Comment: s/p  @ 0553. Mom doing well. Transfer to 

postpartum. Plan to dc antibiotics as pt has had no fever, leukocytosis for 

four days. We will provide norco for pain control. Pt currently denies back 

pain but still has epidural likely contributing to pain control. We will follow-

up with urology recs and likely get a CT scan tomorrow to better evaluate for 

obstructive uropathy.   





(2) Prenatal hydronephrosis in pregnancy in third trimester, antepartum 

condition


Code(s): O35.8XX0 - MATERNAL CARE FOR OTH FETAL ABNORMALITY AND DAMAGE, UNSP   

Status: Acute   Comment: treating empirically for sepsis 2/2 obstructive 

uropathy.  Afebrile >48hrs. Epidural wearing off. Pt prescribed norco prn. We 

will dc the anitibiotics as she has been afebrile for over 48 hours.   





(3) Sepsis


Code(s): A41.9 - SEPSIS, UNSPECIFIED ORGANISM   Status: Resolved   


  QualifierTitle:    Sepsis type: sepsis due to unspecified organism   

Qualified Code(s): A41.9 - Sepsis, unspecified organism   


Comment: 18 year old G1 who initially presented with tachycardia, fever to 

101.0 F, and leukocytosis


- presented with lactic acidosis. No resolved after 3L fluids 


- Flu swab negative


- Blood and urine cultures NGTD


- UA negative


- Abdominal ultrasound showed right moderate to severe hyodronephrosis


- Urology consulted, recommended IOL, medically indicated


- Dr. Fuentes consulted, recommended IOL, medically indicated


- Pt is not sick appearing and is stable.   





(4) Leukocytosis


Code(s): D72.829 - ELEVATED WHITE BLOOD CELL COUNT, UNSPECIFIED   Status: 

Resolved   Comment: - Repeat WBC 7.3





   





(5) Lactic acidosis


Code(s): E87.2 - ACIDOSIS   Status: Resolved   Comment: -Resolved   





<Emilie Green - Last Filed: 18 11:04>


 Vital Signs (12 hours)











  Temp Pulse Resp BP


 


 18 12:05  97.8 F  63  18 


 


 18 12:00  98.3 F  65  20  129/88


 


 18 10:20   63  18  124/81


 


 18 09:15  97.8 F  71  18  116/75


 


 18 03:00  99.1 F  71  18  110/61








 Weight











Weight                         69.4 kg














Result Diagrams: 


 18 08:58





 18 05:55


Additional Labs: 


 Post Partum Labs











Blood Type  A POSITIVE   18  20:33    


 


Hep Bs Antigen  Non-Reactive S/CO (NonReactive)   18  20:33    














<Yordy Calderón - Last Filed: 18 14:36>





Attending Addendum





- Attending Addendum


Date/Time: 18 7996





I personally evaluated the patient and discussed the management with Dr. Lozano.


I agree with the History, Examination, Assessment and Plan documented above 

with any addition or exceptions noted below.





D/c antibiotics.  Discuss further mgmt with urology.








<Yordy Calderón - Last Filed: 18 14:36>

## 2018-03-29 NOTE — CT
CT OF THE ABDOMEN AND PELVIS WITHOUT CONTRAST

3/19/18

 

COMPARISON:  

Renal ultrasound 3/26/18.

 

HISTORY: 

Right kidney swelling and right flank pain since Saturday. Patient gave birth early this morning.

 

TECHNIQUE:  

Multiple contiguous axial images were obtained in a CT abdomen and pelvis without contrast. Coronal r
eformats were performed.

 

FINDINGS:  

The uterus is enlarged. There is high density material in the lower uterine segment and a small amoun
t within the uterus which likely represents blood products from recent delivery. There is moderate ri
ght sided hydronephrosis and hydroureter secondary to compression on the ureter by the enlarged uteru
s. No left sided hydronephrosis is seen.

 

The liver, gallbladder, adrenal glands, spleen, and pancreas are unremarkable on this limited noncont
rast examination. No free air, free fluid or stranding changes are seen in the abdomen or pelvis.

 

There are trace bilateral pleural effusions. There is air in the back and epidural space likely from 
recent epidural anesthesia prior to delivery. The bones are unremarkable. 

 

IMPRESSION:  

Right sided hydronephrosis is secondary to compression on the ureter by the enlarged uterus. 

 

POS: [place]

## 2018-03-29 NOTE — PDOC.LDPN
Labor & Delivery Progress Note





- Subjective


Subjective: comfortable, no concerns





- Objective


Vital signs reviewed and normal: yes


General: NAD, resting


Uterine fundus: non tender


SVE: 9/100/+2


FHT: category 2 (min variability / occasional variable decel), variable 

decelerations (few mild variable decels)


[place] contractions every: 2-4min


Other exam findings: sutures/ fontanelles show straight OP on maternal right/ 

mildly asynclitic


Resuscitative measures: maternal position change





- Assessment


(1) Term pregnancy


Code(s): Z34.80 - ENCOUNTER FOR SUPRVSN OF NORMAL PREGNANCY, UNSP TRIMESTER   

Current Visit: Yes   Status: Acute   Comment: expectant management with 

continued labor augmentation and resucitative measures.


pit at 6 currently   





(2) Intrauterine pregnancy in teenager


Code(s): Z34.80 - ENCOUNTER FOR SUPRVSN OF NORMAL PREGNANCY, UNSP TRIMESTER   

Current Visit: Yes   Status: Acute   





(3) Prenatal hydronephrosis in pregnancy in third trimester, antepartum 

condition


Code(s): O35.8XX0 - MATERNAL CARE FOR OTH FETAL ABNORMALITY AND DAMAGE, UNSP   

Current Visit: Yes   Status: Acute   Comment: Continuing amp/gent per urology 

recs, treating empirically for sepsis 2/2 obstructive uropathy.  Afebrile >

48hrs. Pain well controlled with epidural   


Plan: continue plan of care, pitocin for augmentation

## 2018-03-29 NOTE — PDOC.OPDEL
OB Operative/Delivery Note


Delivery Dr/Surgeon: Stephane; Residents [facility] and [facility]


Pre-Delivery Diagnosis: medically indicated induction


Procedure/Post Delivery Dx: spontaneous vaginal delivery


Weeks gestation: 37 (6 days)


Anesthesia: epidural





- Additional Findings/Plan


Placenta delivered: spontaneous


Repaired Obstetrical Laceration: 2nd degree (bilateral sidewalls)


Estimated blood loss: 300


Compilations/Other Findings: 





None


Post delivery plan: routine recovery





Attending Addendum





- Attending Addendum


Date/Time: 18 0651





I was present for entire delivery and repair.





 now P1 s/p  to ET male  with reassuring apgars.   

delivered straight OA and subsequently delivered atraumatically with nuchal 

cord x 1.  Cried at perineum.  Optimal cord clamping performed.  Cord clamped 

and cut and blood sampled.  Placenta delivered with CCT.  Uterus firmed with 

fundal massage and IV pitocin.  Second degree laceration and sidewalls repaired 

in the usual fashion with 3-0 chromic.   cc.  Abx amp/gent for 

complicated UTI.  Counts correct x 2.  Mom and baby to postpartum.

## 2018-03-29 NOTE — PRG
DATE OF SERVICE:  03/29/2018

 

SUBJECTIVE:  The patient was induced for labor and had her baby at the 6:00 this morning.  Both baby 
and mother are doing well.  The patient states that she is still having some right-sided flank pain, 
although it is better than before.  However, she is now complaining of primarily uterine and vaginal 
pains from her delivery.  She denies any fevers.  She is otherwise doing well.  She has chosen to jose manuel
astfeed.

 

OBJECTIVE:

VITAL SIGNS:  Temperature 97.8, pulse 63, respirations 18, blood pressure 129/88, saturation 99% on r
oom air.

GENERAL:  No apparent distress, comfortable, communicative and alert.

CARDIOVASCULAR:  Regular rate and rhythm.  Normal S1 and S2.

CHEST:  No increased work of breathing.  Symmetric expansion of lungs.

ABDOMEN:  Post-gravid uterus, mild right-sided tenderness to palpation.  Suprapubic tenderness as exp
ected from delivery.

EXTREMITIES:  1+ edema bilaterally.  Otherwise, no clubbing or cyanosis.

GENITOURINARY:  Deferred at this time given the recent delivery.

 

ASSESSMENT AND PLAN:  An 18-year-old white female, G1, P1, now with recent delivery of a healthy baby
 boy, now that she is no longer pregnant.  I think we can do a CT without any significant risk.  We w
ill order a CT stone protocol to evaluate for ureteral stone and also assess the degree and possible 
etiology of her hydronephrosis.  Further recommendations and treatment plans will be made based on th
e CT.  I will continue to follow along and offer advice and recommendations as well as treatment.

## 2018-03-30 NOTE — PRG
DATE OF SERVICE:  03/30/2018

 

SUBJECTIVE:  The patient states she is feeling much better.  She is still having pelvic pain.  She ha
s mild right-sided flank pain still.  She did undergo a CT, which is documented below.  She denies an
y fevers, nausea, or vomiting.

 

OBJECTIVE:

VITAL SIGNS:  Temperature 98.2, pulse 87, respirations 20, blood pressure 113/73, saturation 99% on r
oom air.

GENERAL:  In no apparent distress, communicative and alert.

CARDIOVASCULAR:  Regular rate and rhythm.

CHEST:  No increased work of breathing.  Symmetric expansion of lungs.

ABDOMEN:  Post-gravid uterus, soft, mildly tender to palpation on the suprapubic area.

EXTREMITIES:  A 1+ edema.  No clubbing or cyanosis.

 

LABORATORY EVALUATION:  The full set of labs is in the [facility] system, which I have reviewed.  Of no
te, this patient's white count is currently 12.7 with a creatinine of 0.55, sodium of 134.  CT stone 
protocol from 03/29/2018 demonstrates a moderate right-sided hydronephrosis, improved from prior ultr
asound, secondary to compression of the ureter by an enlarged uterus.  There were no stones or urolit
hiasis noted.

 

ASSESSMENT AND PLAN:  An 18-year-old white female G1, P1, recently delivered to a healthy baby boy wi
th hydronephrosis on the right secondary to a gravid uterus.  There were no stones, and as such, no s
urgical intervention is needed.  It will probably take some time for the uterus to shrink back down t
o her normal size, at which time the hydronephrosis on the right side should resolve.  I do not think
 any further intervention is necessary at this point unless the patient experiences worsening right-s
ided flank pain or experiences another fever, at which time a ureteral stent may be necessary until t
he uterus is back down to its pregravid size.  From my standpoint, we will check on the patient one a
dditional time to ensure that she is symptom free and I think she can be discharged when appropriate 
by the Family Medicine team.  Follow up with Urology is not necessary unless she has fevers, worsenin
g right-sided flank pain, blood in her urine or any other concerning signs or symptoms of urinary pro
blems that she has.  I have given her my office number and she can contact us if needed.  Otherwise, 
she can follow up with us on a p.r.n. basis.

## 2018-03-30 NOTE — PDOC.PP
Post Partum Progress Note


Post Partum Day #: 1


Subjective: 





Slept well. Ambulating, passing flatus, tolerating a normal diet. C/o mild 

cramping abdominal pain. Right-sided back pain was a 1/10 this am.


PO intake tolerated: yes


Flatus: yes


Ambulation: yes


 Vital Signs (12 hours)











  Temp Pulse Resp BP Pulse Ox


 


 18 04:00  97.8 F  65  18  119/77 


 


 18 00:00  97.9 F  79  18  118/68 


 


 18 20:00  97.8 F  72  20  118/76  98








 Weight











Weight                         69.4 kg

















- Physical Examination


General: NAD


Cardiovascular: no m/r/g, RRR


Respiratory: clear to auscultation bilaterally, non-labored breathing


Abdominal: + bowel sounds, lochia (minimal), appropriately TTP (no CVA 

tenderness)


Fundus firm & at: 1cm below umbilicus


Neurological: no gross focal deficits


Psychiatric: A&Ox3, normal affect


Result Diagrams: 


 18 05:33





 18 05:55


Additional Labs: 


 Post Partum Labs











Blood Type  A POSITIVE   18  20:33    


 


Hep Bs Antigen  Non-Reactive S/CO (NonReactive)   18  20:33    











(1) Term pregnancy delivered


Code(s): O80 - ENCOUNTER FOR FULL-TERM UNCOMPLICATED DELIVERY   Status: Acute   

Comment: s/p  @ 0553 on 3/29. Mom doing well (ambulating, passing flatus, 

tolerating normal diet, minimal bleeding, and minimal pain that is mostly 

abdominal cramping). Antibiotics dc'd yesterday as pt has had no fever, 

leukocytosis for four days. We will continue ibuprofen for pain control. Pt 

currently denies back pain. We will follow-up with urology recs today.   





(2) Term pregnancy


Code(s): Z34.80 - ENCOUNTER FOR SUPRVSN OF NORMAL PREGNANCY, UNSP TRIMESTER   

Status: Resolved   





(3) Prenatal hydronephrosis in pregnancy in third trimester, antepartum 

condition


Code(s): O35.8XX0 - MATERNAL CARE FOR OTH FETAL ABNORMALITY AND DAMAGE, UNSP   

Status: Acute   Comment: Pt was treated empirically for sepsis 2/2 obstructive 

uropathy.  Afebrile >48hrs. Ibuprofen for pain currently. Pending urology 

recommendations.   





(4) Sepsis


Code(s): A41.9 - SEPSIS, UNSPECIFIED ORGANISM   Status: Resolved   


  QualifierTitle:    Sepsis type: sepsis due to unspecified organism   

Qualified Code(s): A41.9 - Sepsis, unspecified organism   


Comment: 18 year old G1 who initially presented with tachycardia, fever to 

101.0 F, and leukocytosis, now s/p IOL and  on 3/29 @ 0553. see above.   





(5) Leukocytosis


Code(s): D72.829 - ELEVATED WHITE BLOOD CELL COUNT, UNSPECIFIED   Status: 

Resolved   Comment: - Repeat WBC 7.3





   





(6) Lactic acidosis


Code(s): E87.2 - ACIDOSIS   Status: Resolved   Comment: -Resolved   





<Emilie Green - Last Filed: 18 08:49>


 Vital Signs (12 hours)











  Temp Pulse Resp BP


 


 18 08:30  97.9 F  75  20 


 


 18 08:00  97.9 F  75  20  117/68


 


 18 04:00  97.8 F  65  18  119/77


 


 18 00:00  97.9 F  79  18  118/68








 Weight











Weight                         69.4 kg














Result Diagrams: 


 18 05:33





 18 05:55


Additional Labs: 


 Post Partum Labs











Blood Type  A POSITIVE   18  20:33    


 


Hep Bs Antigen  Non-Reactive S/CO (NonReactive)   18  20:33    














<Yordy Calderón - Last Filed: 18 11:49>





Attending Addendum





- Attending Addendum


Date/Time: 18 0626





I personally evaluated the patient and discussed the management with Dr. Lozano and team.


I agree with and repeated the History, Examination, Assessment and Plan 

documented above with any addition or exceptions noted below.





No stone.  Afebrile with normal WBC count and minimal pain.  Likely d/c 

tomorrow.








<Yordy Calderón - Last Filed: 18 11:49>

## 2018-03-31 NOTE — PRG
DATE OF SERVICE:  03/31/2018.

 

SUBJECTIVE:  The patient has done well.  Her pain is now diminished to gone.  Objectively, her vitals
 have been stable and she has been using the toilet without difficulty.

 

ASSESSMENT AND PLAN:  We have an 18-year-old female with severe right hydronephrosis secondary to pre
gnancy as no stone was found post-delivery whose renal colic is now improving and gone.  She can foll
ow up with Dr. Askew as an outpatient.

## 2018-03-31 NOTE — PDOC.PP
Post Partum Progress Note


Post Partum Day #: 2


Subjective: 


Did well overnight, no concerns today.  Desires to go home.


PO intake tolerated: yes


Flatus: yes


Ambulation: yes


 Vital Signs (12 hours)











  Temp Pulse Resp BP


 


 18 20:25  97.9 F  69  20  121/73








 Weight











Weight                         69.4 kg

















- Physical Examination


General: NAD


Cardiovascular: no m/r/g, RRR


Respiratory: clear to auscultation bilaterally


Abdominal: + bowel sounds, appropriately TTP


Fundus firm & at: 2cm below umbilicus


Extremities: negative homans (B)


Neurological: no gross focal deficits


Psychiatric: A&Ox3, normal affect


Result Diagrams: 


 18 05:33





 18 05:55


Additional Labs: 


 Post Partum Labs











Blood Type  A POSITIVE   18  20:33    


 


Hep Bs Antigen  Non-Reactive S/CO (NonReactive)   18  20:33    











(1) Term pregnancy


Code(s): Z34.80 - ENCOUNTER FOR SUPRVSN OF NORMAL PREGNANCY, UNSP TRIMESTER   

Status: Resolved   Comment: S/p  at 0553 on 3/29.  Plan for dc home today. 

   





(2) Prenatal hydronephrosis in pregnancy in third trimester, antepartum 

condition


Code(s): O35.8XX0 - MATERNAL CARE FOR OTH FETAL ABNORMALITY AND DAMAGE, UNSP   

Status: Acute   Comment: Ibuprofen for pain prn. Urology has given her their 

contact info should her pain return or worsen.   





<Serafin Baumann - Last Filed: 18 07:36>


 Weight











Weight                         69.4 kg














Result Diagrams: 


 18 05:33





 18 05:55


Additional Labs: 


 Post Partum Labs











Blood Type  A POSITIVE   18  20:33    


 


Hep Bs Antigen  Non-Reactive S/CO (NonReactive)   18  20:33    














<Silvana Cat - Last Filed: 18 22:11>





Attending Addendum





- Attending Addendum


Date/Time: 18 2210





I personally evaluated the patient and discussed the management with Dr. Baumann


I agree with the History, Examination, Assessment and Plan documented above 

with any addition or exceptions noted below- Patient without complaints. 

Ambulating, voiding. Afebrile VSS. PPD#2 s/p - d/c home today. F/u at [facility] in 

2 weeks








<Silvana Cat - Last Filed: 18 22:11>

## 2018-08-27 NOTE — RAD
CHEST 2 VIEWS:

 

HISTORY: 

Chest tightness and shortness of breath.

 

COMPARISON: 

Chest radiograph from 2017.

 

FINDINGS: 

The lungs are clear.  NO pneumothorax or effusion.  Cardiac silhouette and mediastinal contour is wit
hin normal limits.  No acute osseous abnormality.

 

IMPRESSION: 

No acute intrathoracic abnormality.

 

POS: KG
